# Patient Record
Sex: MALE | Race: WHITE | NOT HISPANIC OR LATINO | Employment: FULL TIME | URBAN - METROPOLITAN AREA
[De-identification: names, ages, dates, MRNs, and addresses within clinical notes are randomized per-mention and may not be internally consistent; named-entity substitution may affect disease eponyms.]

---

## 2021-11-22 ENCOUNTER — OUTSIDE PLACE OF SERVICE (OUTPATIENT)
Dept: UROLOGY | Facility: CLINIC | Age: 51
End: 2021-11-22

## 2021-11-22 PROCEDURE — 99232 PR SUBSEQUENT HOSPITAL CARE,LEVL II: ICD-10-PCS | Mod: ,,, | Performed by: UROLOGY

## 2021-11-22 PROCEDURE — 99232 SBSQ HOSP IP/OBS MODERATE 35: CPT | Mod: ,,, | Performed by: UROLOGY

## 2023-07-17 DIAGNOSIS — Z86.69 H/O GUILLAIN-BARRE SYNDROME: ICD-10-CM

## 2023-07-17 DIAGNOSIS — G25.2 INTENTION TREMOR: Primary | ICD-10-CM

## 2023-11-07 ENCOUNTER — OFFICE VISIT (OUTPATIENT)
Dept: NEUROLOGY | Facility: CLINIC | Age: 53
End: 2023-11-07
Payer: COMMERCIAL

## 2023-11-07 VITALS
HEIGHT: 72 IN | WEIGHT: 195 LBS | HEART RATE: 89 BPM | BODY MASS INDEX: 26.41 KG/M2 | DIASTOLIC BLOOD PRESSURE: 97 MMHG | SYSTOLIC BLOOD PRESSURE: 147 MMHG

## 2023-11-07 DIAGNOSIS — G25.2 INTENTION TREMOR: ICD-10-CM

## 2023-11-07 DIAGNOSIS — G25.0 BENIGN ESSENTIAL TREMOR: Primary | ICD-10-CM

## 2023-11-07 DIAGNOSIS — Z86.69 H/O GUILLAIN-BARRE SYNDROME: ICD-10-CM

## 2023-11-07 PROCEDURE — 3077F PR MOST RECENT SYSTOLIC BLOOD PRESSURE >= 140 MM HG: ICD-10-PCS | Mod: CPTII,S$GLB,, | Performed by: PSYCHIATRY & NEUROLOGY

## 2023-11-07 PROCEDURE — 99205 PR OFFICE/OUTPT VISIT, NEW, LEVL V, 60-74 MIN: ICD-10-PCS | Mod: S$GLB,,, | Performed by: PSYCHIATRY & NEUROLOGY

## 2023-11-07 PROCEDURE — 3080F DIAST BP >= 90 MM HG: CPT | Mod: CPTII,S$GLB,, | Performed by: PSYCHIATRY & NEUROLOGY

## 2023-11-07 PROCEDURE — 3008F BODY MASS INDEX DOCD: CPT | Mod: CPTII,S$GLB,, | Performed by: PSYCHIATRY & NEUROLOGY

## 2023-11-07 PROCEDURE — 99205 OFFICE O/P NEW HI 60 MIN: CPT | Mod: S$GLB,,, | Performed by: PSYCHIATRY & NEUROLOGY

## 2023-11-07 PROCEDURE — 3008F PR BODY MASS INDEX (BMI) DOCUMENTED: ICD-10-PCS | Mod: CPTII,S$GLB,, | Performed by: PSYCHIATRY & NEUROLOGY

## 2023-11-07 PROCEDURE — 1160F RVW MEDS BY RX/DR IN RCRD: CPT | Mod: CPTII,S$GLB,, | Performed by: PSYCHIATRY & NEUROLOGY

## 2023-11-07 PROCEDURE — 3080F PR MOST RECENT DIASTOLIC BLOOD PRESSURE >= 90 MM HG: ICD-10-PCS | Mod: CPTII,S$GLB,, | Performed by: PSYCHIATRY & NEUROLOGY

## 2023-11-07 PROCEDURE — 99999 PR PBB SHADOW E&M-EST. PATIENT-LVL III: ICD-10-PCS | Mod: PBBFAC,,, | Performed by: PSYCHIATRY & NEUROLOGY

## 2023-11-07 PROCEDURE — 1160F PR REVIEW ALL MEDS BY PRESCRIBER/CLIN PHARMACIST DOCUMENTED: ICD-10-PCS | Mod: CPTII,S$GLB,, | Performed by: PSYCHIATRY & NEUROLOGY

## 2023-11-07 PROCEDURE — 99999 PR PBB SHADOW E&M-EST. PATIENT-LVL III: CPT | Mod: PBBFAC,,, | Performed by: PSYCHIATRY & NEUROLOGY

## 2023-11-07 PROCEDURE — 1159F MED LIST DOCD IN RCRD: CPT | Mod: CPTII,S$GLB,, | Performed by: PSYCHIATRY & NEUROLOGY

## 2023-11-07 PROCEDURE — 3077F SYST BP >= 140 MM HG: CPT | Mod: CPTII,S$GLB,, | Performed by: PSYCHIATRY & NEUROLOGY

## 2023-11-07 PROCEDURE — 1159F PR MEDICATION LIST DOCUMENTED IN MEDICAL RECORD: ICD-10-PCS | Mod: CPTII,S$GLB,, | Performed by: PSYCHIATRY & NEUROLOGY

## 2023-11-07 RX ORDER — GABAPENTIN 100 MG/1
100 CAPSULE ORAL 2 TIMES DAILY
Qty: 60 CAPSULE | Refills: 11 | Status: SHIPPED | OUTPATIENT
Start: 2023-11-07 | End: 2024-11-06

## 2023-11-07 RX ORDER — METHOCARBAMOL 500 MG/1
500 TABLET, FILM COATED ORAL 3 TIMES DAILY PRN
COMMUNITY
Start: 2023-06-30

## 2023-11-07 RX ORDER — ASCORBIC ACID 500 MG
500 TABLET ORAL DAILY
COMMUNITY

## 2023-11-07 RX ORDER — ZINC GLUCONATE 50 MG
50 TABLET ORAL DAILY
COMMUNITY

## 2023-11-07 RX ORDER — GABAPENTIN 300 MG/1
300 CAPSULE ORAL NIGHTLY
COMMUNITY

## 2023-11-07 RX ORDER — LANOLIN ALCOHOL/MO/W.PET/CERES
500 CREAM (GRAM) TOPICAL DAILY
COMMUNITY

## 2023-11-07 RX ORDER — PROPRANOLOL HYDROCHLORIDE 20 MG/1
20 TABLET ORAL 2 TIMES DAILY
Qty: 60 TABLET | Refills: 5 | Status: SHIPPED | OUTPATIENT
Start: 2023-11-07 | End: 2024-11-06

## 2023-11-07 NOTE — PROGRESS NOTES
Chief Complaint   Patient presents with    Guillian Champlain     NP: Pt referred by Dr. Angy Reyes for neuro consult to evaluate for guillian barre; Pt states was diagnosed 11/15/2021 symptoms started of soreness to lower calf muscles and upper arms and had a fall later that day unable to move upper or lower extremities; currently difficulty with overall generalized weakness, difficulty with gate, tremor to both hands more significant to left arm         This is a 53 y.o. male here for new patient evaluation for GBS.  Patient was diagnosed November 15, 2021.  Patient recalls that he had gastroenteritis which he thought was related to food poisoning and then about a week later noted some weakness in his calves in his shoulders.  He went to sleep that night and woke up and can not move his arms and legs and only could move his head.  He was in the hospital for 7 days and received IVIG.  He was never intubated although he did have some mild trouble swallowing.  Who was discharged to [facility] where he stayed for a month.  He was still in a motorized wheelchair when he left there.  He did not improve significantly until this year.  Several months ago he got to the point where he is walking only with a cane.  He still has significant cramps in his hands and feet.  At times he has tingling and restlessness in his legs.  He does take gabapentin as needed.  Is also taking medical marijuana.  He has had a tremor since before he was diagnosed with Guillain-Champlain.  The tremor has gotten worse since.  Tremor is both at rest and with action.     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    GABAPENTIN (NEURONTIN) 300 MG CAPSULE    Take 300 mg by mouth every evening.    MAGNESIUM OXIDE (MAG-OX) 400 MG (241.3 MG MAGNESIUM) TABLET    Take 500 mg by mouth once daily.     METHOCARBAMOL (ROBAXIN) 500 MG TAB    Take 500 mg by mouth 3 (three) times daily as needed.    MV-MN/IRON/FOLIC ACID/HERB 190 (VITAMIN D3 COMPLETE ORAL)    Take by mouth.    UNABLE TO FIND    medication name: medical marijuana gummies 40 mg CBD 5 mg THC (1/4 to 1 edible by mouth two to four times a day)    UNABLE TO FIND    medication name: liquid THC drops 3.25 mg qPM    ZINC GLUCONATE 50 MG TABLET    Take 50 mg by mouth once daily.        Vitals:    11/07/23 1310   BP: (!) 147/97   Pulse: 89        General: alert and oriented, no acute distress, no audible wheezes, pulse intact, no edema    Cognition and Comprehension  Speech and language intact  Follows commands  Speech fluent  Attention intact  Memory for recent events intact from history taking  Affect pleasant  Fund of knowledge adequate    Cranial nerves  II. Optic: Visual fields full to confrontation both eyes  III, IV, VI. Oculomotor: Intact, Pupils equal, round and reactive to light, no nystagmus  V. Trigeminal: sensation to light touch normal  VII. No facial asymmetry or facial weakness  VIII. Hearing intact to spoken voice  IX/X. Glossopharyngeal/Vagus: Voice normal, palate rises symmetrically  XI. Axillary: Shoulder shrug normal  XII. Hypoglossal: Intact    Muscle Strength and Tone  Normal upper extremity tone  Normal lower extremity tone  Normal upper extremity strength  Normal lower extremity strength  Coarse BUE and BLE tremor, worse with intention, symmetric    Sensation  Intact to light touch and temperature      Coordination and Gait  Finger to nose normal  Gait normal     1. Intention tremor  -     Ambulatory referral/consult to Neurology    2. H/O Guillain-Moline syndrome  -     Ambulatory referral/consult to Neurology     S/p recovery from what sounds like GUERITA. Treat tremor with propranolol and gbp 100 BID for RLS/cramping/tremor

## 2024-09-04 DIAGNOSIS — G25.0 BENIGN ESSENTIAL TREMOR: ICD-10-CM

## 2024-09-04 DIAGNOSIS — G25.2 INTENTION TREMOR: ICD-10-CM

## 2024-09-04 RX ORDER — PROPRANOLOL HYDROCHLORIDE 20 MG/1
TABLET ORAL
Refills: 0 | OUTPATIENT
Start: 2024-09-04

## 2024-09-05 DIAGNOSIS — G25.0 BENIGN ESSENTIAL TREMOR: ICD-10-CM

## 2024-09-05 DIAGNOSIS — G25.2 INTENTION TREMOR: ICD-10-CM

## 2024-09-05 RX ORDER — PROPRANOLOL HYDROCHLORIDE 20 MG/1
TABLET ORAL
Qty: 777 TABLET | Refills: 0 | OUTPATIENT
Start: 2024-09-05

## 2024-10-16 DIAGNOSIS — G25.2 INTENTION TREMOR: ICD-10-CM

## 2024-10-16 DIAGNOSIS — G25.0 BENIGN ESSENTIAL TREMOR: ICD-10-CM

## 2024-10-17 RX ORDER — PROPRANOLOL HYDROCHLORIDE 20 MG/1
20 TABLET ORAL 2 TIMES DAILY
Qty: 180 TABLET | Refills: 3 | Status: SHIPPED | OUTPATIENT
Start: 2024-10-17

## 2024-11-19 DIAGNOSIS — Z12.11 SCREENING FOR COLON CANCER: Primary | ICD-10-CM
